# Patient Record
Sex: FEMALE | Race: OTHER | NOT HISPANIC OR LATINO | URBAN - METROPOLITAN AREA
[De-identification: names, ages, dates, MRNs, and addresses within clinical notes are randomized per-mention and may not be internally consistent; named-entity substitution may affect disease eponyms.]

---

## 2023-01-23 ENCOUNTER — HOSPITAL ENCOUNTER (EMERGENCY)
Facility: HOSPITAL | Age: 48
Discharge: HOME | End: 2023-01-23
Attending: EMERGENCY MEDICINE
Payer: COMMERCIAL

## 2023-01-23 VITALS
HEIGHT: 66 IN | BODY MASS INDEX: 22.5 KG/M2 | HEART RATE: 86 BPM | DIASTOLIC BLOOD PRESSURE: 90 MMHG | TEMPERATURE: 98 F | OXYGEN SATURATION: 98 % | SYSTOLIC BLOOD PRESSURE: 180 MMHG | RESPIRATION RATE: 18 BRPM | WEIGHT: 140 LBS

## 2023-01-23 DIAGNOSIS — M54.2 NECK PAIN: ICD-10-CM

## 2023-01-23 DIAGNOSIS — M62.830 MUSCLE SPASM OF BACK: Primary | ICD-10-CM

## 2023-01-23 DIAGNOSIS — S13.9XXA CERVICAL SPRAIN, INITIAL ENCOUNTER: ICD-10-CM

## 2023-01-23 PROCEDURE — 99283 EMERGENCY DEPT VISIT LOW MDM: CPT

## 2023-01-23 RX ORDER — TIZANIDINE 4 MG/1
4 TABLET ORAL 3 TIMES DAILY PRN
Qty: 15 TABLET | Refills: 0 | Status: SHIPPED | OUTPATIENT
Start: 2023-01-23 | End: 2023-01-28

## 2023-01-23 NOTE — Clinical Note
"Ninfa Odonnell" Estuardo was seen and treated in our emergency department on 1/23/2023.  She may return to work on 01/25/2023.       If you have any questions or concerns, please don't hesitate to call.      ANNIE Rojas"

## 2023-01-23 NOTE — DISCHARGE INSTRUCTIONS
Deep heating rub such as BioFreeze or Icy Hot to back  Tizanidine as needed for muscle pain, do not take and work/drive
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
